# Patient Record
Sex: FEMALE | NOT HISPANIC OR LATINO | URBAN - METROPOLITAN AREA
[De-identification: names, ages, dates, MRNs, and addresses within clinical notes are randomized per-mention and may not be internally consistent; named-entity substitution may affect disease eponyms.]

---

## 2021-03-02 ENCOUNTER — IMPORTED ENCOUNTER (OUTPATIENT)
Dept: URBAN - METROPOLITAN AREA CLINIC 1 | Facility: CLINIC | Age: 71
End: 2021-03-02

## 2021-03-02 PROBLEM — H25.812: Noted: 2021-03-02

## 2021-03-02 PROBLEM — Z79.4: Noted: 2021-03-02

## 2021-03-02 PROBLEM — H40.023: Noted: 2021-03-02

## 2021-03-02 PROBLEM — Z96.1: Noted: 2021-03-02

## 2021-03-02 PROBLEM — E11.9: Noted: 2021-03-02

## 2021-03-02 PROBLEM — H04.123: Noted: 2021-03-02

## 2021-03-02 PROBLEM — H18.413: Noted: 2021-03-02

## 2021-03-02 PROCEDURE — 99204 OFFICE O/P NEW MOD 45 MIN: CPT

## 2021-03-02 PROCEDURE — 92133 CPTRZD OPH DX IMG PST SGM ON: CPT

## 2021-03-02 NOTE — PATIENT DISCUSSION
1.  DM Type II (Insulin) without sign of diabetic retinopathy and no blot heme on dilated retinal examination today OU No Macular Edema:  Discussed the pathophysiology of diabetes and its effect on the eye and risk of blindness. Stressed the importance of strong glucose control. Advised of importance of at least yearly dilated examinations but to contact us immediately for any problems or concerns. 2. Glaucoma Suspect OU (CD 0.80 OU) : OCT shows minimal thinning OD WNL OS. FM HX. Patient is considered high risk. Condition was discussed with patient and patient understands. Will continue to monitor patient for any progression in condition. Patient was advised to call us with any problems questions or concerns. 3.  Cataract OS: Observe for now without intervention. The patient was advised to contact us if any change or worsening of vision4. Dry Eyes OU - Recommend ATs TID OU routinely. 5. Pseudophakia OD - ([place] October 2020) Pt currently unhappy with myotic state OD. She would prefer to have had distance vision without correction. Discussed consideration of [address] OD vs specs. 6. Arcus OU - Observe. Patient deferred Manifest Rx today. Return for an appointment in 6 months 10/DFE/glare/HVF with Dr. Daquan Jacobson.

## 2021-09-21 ENCOUNTER — IMPORTED ENCOUNTER (OUTPATIENT)
Dept: URBAN - METROPOLITAN AREA CLINIC 1 | Facility: CLINIC | Age: 71
End: 2021-09-21

## 2021-09-21 PROBLEM — H16.143: Noted: 2021-09-21

## 2021-09-21 PROBLEM — H40.013: Noted: 2021-09-21

## 2021-09-21 PROBLEM — H04.123: Noted: 2021-09-21

## 2021-09-21 PROCEDURE — 92015 DETERMINE REFRACTIVE STATE: CPT

## 2021-09-21 PROCEDURE — 92083 EXTENDED VISUAL FIELD XM: CPT

## 2021-09-21 PROCEDURE — 99213 OFFICE O/P EST LOW 20 MIN: CPT

## 2021-09-21 NOTE — PATIENT DISCUSSION
1.  Glaucoma Suspect OU (CD 0.80 OU) -- HVF WNL OU today. FM HX. Patient is considered low risk. Condition was discussed with patient and patient understands. Will continue to monitor patient for any progression in condition. Patient was advised to call us with any problems questions or concerns. 2.  Dry Eyes with PEK OU -- Continue ATs QID OU routinely. Pt notes no improvement on tears alone. Start Restasis BID OU (erx'd) 3. Cataract OS -- Significant. Will plan Phaco after next visit once TRANG under better control. Will likely plan for OS to be distance to complete monovision state. OS it dominant eye. 4.  Pseudophakia OD -- ([place] October 2020) Pt currently unhappy with myopic state OD. She would prefer to have had distance vision without correction. Previously discussed consideration of [address] OD vs specs. 5. Arcus OU -- Observe. 6. H/o DM Type II (Insulin) w/o DR7. PCO OD -- Likely significant will re-evaluate at next visit. Patient deferred Manifest Rx today. Return for an appointment in 2-3 month 10/k check/DFE/Glare OU with Dr. Moi Valle.

## 2021-12-09 ENCOUNTER — IMPORTED ENCOUNTER (OUTPATIENT)
Dept: URBAN - METROPOLITAN AREA CLINIC 1 | Facility: CLINIC | Age: 71
End: 2021-12-09

## 2021-12-09 PROBLEM — Z79.4: Noted: 2021-12-09

## 2021-12-09 PROBLEM — E11.9: Noted: 2021-12-09

## 2021-12-09 PROBLEM — H40.013: Noted: 2021-12-09

## 2021-12-09 PROBLEM — H04.123: Noted: 2021-12-09

## 2021-12-09 PROBLEM — H16.143: Noted: 2021-12-09

## 2021-12-09 PROBLEM — H25.812: Noted: 2021-12-09

## 2021-12-09 PROBLEM — H26.491: Noted: 2021-12-09

## 2021-12-09 PROCEDURE — 92015 DETERMINE REFRACTIVE STATE: CPT

## 2021-12-09 PROCEDURE — 92012 INTRM OPH EXAM EST PATIENT: CPT

## 2021-12-09 NOTE — PATIENT DISCUSSION
1.  Cataract OS -- Visually Significant however patient defers phaco at this time. **Will likely plan for OS to be distance to complete monovision state as OS is the dominant eye. 2.  PCO OD -- Observe and consider YAG once visually significant and pt becomes symptomatic. 3.  TRANG w/ PEK OU -- PEK improved OU on Tears (pt unable to get Restasis secondary to cost) Continue ATs QID OU routinely. 4.  DM Type II (Insulin) without sign of diabetic retinopathy and no blot heme on dilated retinal examination today OU No Macular Edema:  Discussed the pathophysiology of diabetes and its effect on the eye and risk of blindness. Stressed the importance of strong glucose control. Advised of importance of at least yearly dilated examinations but to contact us immediately for any problems or concerns. 5. Glaucoma Suspect OU (CD 0.80 OU) --  FM HX. IOP stable. Past w/u negative. Patient is considered low risk. Condition was discussed with patient and patient understands. Will continue to monitor patient for any progression in condition. Patient was advised to call us with any problems questions or concerns. 6.  Pseudophakia OD -- ([place] October 2020) Pt currently unhappy with myopic state OD. She would prefer to have had distance vision without correction. Previously discussed consideration of [address] OD vs specs. 7. Arcus OU -- Observe. Return for an appointment in 6 months for a 30/OCT/glare OU with Dr. Stacy Peng.

## 2022-04-03 ASSESSMENT — VISUAL ACUITY
OS_GLARE: 20/60
OD_GLARE: 20/40
OS_CC: 20/20
OD_SC: 20/20-2
OD_SC: J1
OD_CC: 20/150
OS_CC: 20/25
OD_GLARE: 20/60
OS_SC: 20/40
OD_SC: 20/25-2
OS_SC: 20/20
OD_SC: J1+-1
OS_GLARE: 20/40
OS_GLARE: 20/150

## 2022-04-03 ASSESSMENT — TONOMETRY
OD_IOP_MMHG: 14
OD_IOP_MMHG: 13
OS_IOP_MMHG: 14
OS_IOP_MMHG: 19
OS_IOP_MMHG: 16
OD_IOP_MMHG: 15

## 2022-07-11 ENCOUNTER — COMPREHENSIVE EXAM (OUTPATIENT)
Dept: URBAN - METROPOLITAN AREA CLINIC 1 | Facility: CLINIC | Age: 72
End: 2022-07-11

## 2022-07-11 DIAGNOSIS — H16.143: ICD-10-CM

## 2022-07-11 DIAGNOSIS — H25.812: ICD-10-CM

## 2022-07-11 DIAGNOSIS — E11.9: ICD-10-CM

## 2022-07-11 DIAGNOSIS — H26.491: ICD-10-CM

## 2022-07-11 DIAGNOSIS — H04.123: ICD-10-CM

## 2022-07-11 DIAGNOSIS — Z79.4: ICD-10-CM

## 2022-07-11 DIAGNOSIS — H40.1131: ICD-10-CM

## 2022-07-11 DIAGNOSIS — H35.033: ICD-10-CM

## 2022-07-11 PROCEDURE — 92133 CPTRZD OPH DX IMG PST SGM ON: CPT

## 2022-07-11 PROCEDURE — 92015 DETERMINE REFRACTIVE STATE: CPT

## 2022-07-11 PROCEDURE — 99214 OFFICE O/P EST MOD 30 MIN: CPT

## 2022-07-11 ASSESSMENT — TONOMETRY
OS_IOP_MMHG: 14
OD_IOP_MMHG: 14

## 2022-07-11 ASSESSMENT — VISUAL ACUITY
OS_CC: 20/40
OD_CC: 20/20 -2
OS_BAT: 20/200
OD_BAT: 20/100

## 2022-07-11 NOTE — PATIENT DISCUSSION
(new)(CD: 0.80 OU) - IOP stable. (+) strong FHX (mother, brother). Relative decrease RNFL OU by OCT. Start Latanoprost QHS OU (erx'd).  Will perform VF after phaco.

## 2022-07-11 NOTE — PATIENT DISCUSSION
Visually Significant secondary to glare; schedule YAG Cap OD. Pros, cons, risks and benefits discussed with patient. Patient wishes to proceed.

## 2022-07-11 NOTE — PATIENT DISCUSSION
Visually Significant secondary to glare, discussed the risks, benefits, alternatives, and limitations of cataract surgery. The patient stated a full understanding and a desire to proceed with the procedure. The patient will need to return for pre-op appointment with cataract measurements and to have any additional questions answered, and start pre-operative eye drops as directed. Schedule Phaco/pcl OS.

## 2022-07-14 ENCOUNTER — PRE-OP/H&P (OUTPATIENT)
Dept: URBAN - METROPOLITAN AREA CLINIC 1 | Facility: CLINIC | Age: 72
End: 2022-07-14

## 2022-07-14 VITALS
BODY MASS INDEX: 29.82 KG/M2 | HEIGHT: 67 IN | HEART RATE: 91 BPM | DIASTOLIC BLOOD PRESSURE: 69 MMHG | WEIGHT: 190 LBS | SYSTOLIC BLOOD PRESSURE: 130 MMHG

## 2022-07-14 DIAGNOSIS — H26.491: ICD-10-CM

## 2022-07-14 DIAGNOSIS — Z96.1: ICD-10-CM

## 2022-07-14 DIAGNOSIS — H25.812: ICD-10-CM

## 2022-07-14 PROCEDURE — 99499 UNLISTED E&M SERVICE: CPT

## 2022-07-14 PROCEDURE — 66821 AFTER CATARACT LASER SURGERY: CPT

## 2022-07-14 PROCEDURE — 92025 CPTRIZED CORNEAL TOPOGRAPHY: CPT

## 2022-07-14 PROCEDURE — 92136 OPHTHALMIC BIOMETRY: CPT

## 2022-07-14 ASSESSMENT — TONOMETRY
OS_IOP_MMHG: 10
OD_IOP_MMHG: 10

## 2022-07-14 ASSESSMENT — VISUAL ACUITY
OS_BAT: 20/200
OD_BAT: 20/100
OD_CC: 20/20 -2
OS_CC: 20/40

## 2022-07-14 NOTE — PROCEDURE NOTE: CLINICAL
PROCEDURE NOTE: YAG Capsulotomy OD. Diagnosis: Other Secondary Cataract. Anesthesia: Topical. The purpose and nature of the procedure, possible alternative methods of treatment, the risks involved and the possibility of complications were discussed with patient. The Patient wishes to proceed and the consent was signed. 1 gtt Prolensa applied. The laser was then performed under topical anesthesia with no complications. Post op instructions were given to patient as well as a follow-up appointment. Patient was advised to call our office if any questions or concerns. Omaira Busby

## 2022-07-14 NOTE — PATIENT DISCUSSION
(CD: 0.80 OU) IOP stable. (+) strong FHX (mother, brother). Relative decrease RNFL OU by OCT. Continue Latanoprost QHS OU.  Will perform VF after phaco.

## 2022-08-16 ENCOUNTER — PRE-OP/H&P (OUTPATIENT)
Dept: URBAN - METROPOLITAN AREA CLINIC 1 | Facility: CLINIC | Age: 72
End: 2022-08-16

## 2022-08-16 VITALS
HEIGHT: 67 IN | WEIGHT: 190 LBS | DIASTOLIC BLOOD PRESSURE: 72 MMHG | HEART RATE: 85 BPM | SYSTOLIC BLOOD PRESSURE: 128 MMHG | BODY MASS INDEX: 29.82 KG/M2

## 2022-08-16 DIAGNOSIS — H25.812: ICD-10-CM

## 2022-08-16 PROCEDURE — 99499 UNLISTED E&M SERVICE: CPT

## 2022-08-16 PROCEDURE — 92025 CPTRIZED CORNEAL TOPOGRAPHY: CPT

## 2022-08-16 PROCEDURE — 92136 OPHTHALMIC BIOMETRY: CPT

## 2022-08-16 ASSESSMENT — VISUAL ACUITY
OS_CC: 20/40
OS_BAT: 20/200
OD_CC: 20/20
OD_SC: 20/60-2

## 2022-08-16 ASSESSMENT — TONOMETRY
OD_IOP_MMHG: 11
OS_IOP_MMHG: 11

## 2022-09-14 ENCOUNTER — SURGERY/PROCEDURE (OUTPATIENT)
Dept: URBAN - METROPOLITAN AREA SURGERY 1 | Facility: SURGERY | Age: 72
End: 2022-09-14

## 2022-09-14 DIAGNOSIS — H25.812: ICD-10-CM

## 2022-09-14 PROCEDURE — 66984 XCAPSL CTRC RMVL W/O ECP: CPT

## 2022-09-15 ENCOUNTER — POST-OP (OUTPATIENT)
Dept: URBAN - METROPOLITAN AREA CLINIC 1 | Facility: CLINIC | Age: 72
End: 2022-09-15

## 2022-09-15 DIAGNOSIS — Z96.1: ICD-10-CM

## 2022-09-15 PROCEDURE — 99024 POSTOP FOLLOW-UP VISIT: CPT

## 2022-09-15 ASSESSMENT — VISUAL ACUITY: OD_CC: 20/30

## 2022-09-15 ASSESSMENT — TONOMETRY: OS_IOP_MMHG: 16

## 2022-09-15 NOTE — PATIENT DISCUSSION
Use Prolensa and Tobradex XT gtts through completion of PO gtt chart regimen/ Per our instructions given to patient.

## 2022-09-15 NOTE — PATIENT DISCUSSION
(CD: 0.80 OU) IOP stable. (+) strong FamHx. Continue Latanoprost QHS OU. Will perform VF after phaco. Patient understands condition, prognosis and need for follow up care.

## 2022-10-11 ENCOUNTER — POST-OP (OUTPATIENT)
Dept: URBAN - METROPOLITAN AREA CLINIC 1 | Facility: CLINIC | Age: 72
End: 2022-10-11

## 2022-10-11 DIAGNOSIS — Z96.1: ICD-10-CM

## 2022-10-11 PROCEDURE — 99024 POSTOP FOLLOW-UP VISIT: CPT

## 2022-10-11 ASSESSMENT — TONOMETRY
OS_IOP_MMHG: 13
OD_IOP_MMHG: 13

## 2022-10-11 ASSESSMENT — VISUAL ACUITY
OD_SC: 20/50+1
OS_SC: 20/40-1

## 2022-10-13 ENCOUNTER — POST-OP (OUTPATIENT)
Dept: URBAN - METROPOLITAN AREA CLINIC 1 | Facility: CLINIC | Age: 72
End: 2022-10-13

## 2022-10-13 DIAGNOSIS — Z96.1: ICD-10-CM

## 2022-10-13 PROCEDURE — 99024 POSTOP FOLLOW-UP VISIT: CPT

## 2022-10-13 ASSESSMENT — VISUAL ACUITY
OD_CC: 20/20
OS_CC: 20/20

## 2022-10-13 ASSESSMENT — TONOMETRY
OS_IOP_MMHG: 17
OD_IOP_MMHG: 17

## 2022-10-13 NOTE — PATIENT DISCUSSION
No bump noted on patient's lid, discussed with patient that she is likely noticing Marcellus Grounds. F/u as scheduled April 2023.

## 2023-04-20 ENCOUNTER — FOLLOW UP (OUTPATIENT)
Dept: URBAN - METROPOLITAN AREA CLINIC 1 | Facility: CLINIC | Age: 73
End: 2023-04-20

## 2023-04-20 DIAGNOSIS — H40.1131: ICD-10-CM

## 2023-04-20 PROCEDURE — 99213 OFFICE O/P EST LOW 20 MIN: CPT

## 2023-04-20 PROCEDURE — 92083 EXTENDED VISUAL FIELD XM: CPT

## 2023-04-20 ASSESSMENT — TONOMETRY
OS_IOP_MMHG: 11
OD_IOP_MMHG: 11

## 2023-04-20 ASSESSMENT — VISUAL ACUITY
OD_CC: 20/20
OS_CC: 20/20

## 2024-01-30 NOTE — PATIENT DISCUSSION
Please request copy of CPAP compliance report.  Thank you.   Visually Significant Secondary to glare, discussed the risks, benefits, alternatives, and limitations of cataract surgery. The patient stated a full understanding and a desire to proceed with the procedure. Discussed with patient if post-op drops are more than $120 for all three combined when filling at their Pharmacy, please call our office to request generic substitutions. Patient came into pre-op appointment alone and lifestyle questioner completed. Patient understands she will need specs post-op to achieve best corrected vision.

## 2024-02-06 ENCOUNTER — COMPREHENSIVE EXAM (OUTPATIENT)
Dept: URBAN - METROPOLITAN AREA CLINIC 1 | Facility: CLINIC | Age: 74
End: 2024-02-06

## 2024-02-06 DIAGNOSIS — H40.1131: ICD-10-CM

## 2024-02-06 DIAGNOSIS — H04.123: ICD-10-CM

## 2024-02-06 DIAGNOSIS — H16.143: ICD-10-CM

## 2024-02-06 DIAGNOSIS — H26.492: ICD-10-CM

## 2024-02-06 DIAGNOSIS — E11.9: ICD-10-CM

## 2024-02-06 PROCEDURE — 92133 CPTRZD OPH DX IMG PST SGM ON: CPT

## 2024-02-06 PROCEDURE — 99214 OFFICE O/P EST MOD 30 MIN: CPT

## 2024-02-06 ASSESSMENT — TONOMETRY
OS_IOP_MMHG: 14
OD_IOP_MMHG: 13

## 2024-02-06 ASSESSMENT — VISUAL ACUITY
OS_CC: 20/20
OD_CC: 20/20-1

## 2024-06-20 ENCOUNTER — HOSPITAL ENCOUNTER (OUTPATIENT)
Facility: HOSPITAL | Age: 74
Setting detail: RECURRING SERIES
Discharge: HOME | End: 2024-06-23
Payer: MEDICARE

## 2024-06-20 PROCEDURE — 97161 PT EVAL LOW COMPLEX 20 MIN: CPT

## 2024-06-20 PROCEDURE — 97110 THERAPEUTIC EXERCISES: CPT

## 2024-06-20 PROCEDURE — 97162 PT EVAL MOD COMPLEX 30 MIN: CPT

## 2024-06-20 PROCEDURE — 97530 THERAPEUTIC ACTIVITIES: CPT

## 2024-06-20 NOTE — PROGRESS NOTES
PHYSICAL / OCCUPATIONAL THERAPY - DAILY TREATMENT NOTE (updated )    Patient Name: Sujey Benito    Date: 2024    : 1950  Insurance: Payor: MEDICARE / Plan: MEDICARE PART A AND B / Product Type: *No Product type* /      Patient  verified Yes     Visit #   Current / Total 1 24   Time   In / Out 1:45 2:50   Pain   In / Out 3 3   Subjective Functional Status/Changes: See POC     TREATMENT AREA =  Pain in left hip [M25.552]  Other low back pain [M54.59]    OBJECTIVE    27 min   Eval - untimed                      Therapeutic Procedures:  Tx Min Billable or 1:1 Min (if diff from Tx Min) Procedure, Rationale, Specifics   13  45032 Therapeutic Exercise (timed):  increase ROM, strength, coordination, balance, and proprioception to improve patient's ability to progress to PLOF and address remaining functional goals. (see flow sheet as applicable)     Details if applicable:  HEP instruction and demonstration     25  03660 Therapeutic Activity (timed):  use of dynamic activities replicating functional movements to increase ROM, strength, coordination, balance, and proprioception in order to improve patient's ability to progress to PLOF and address remaining functional goals.  (see flow sheet as applicable)     Details if applicable:  pt education on relevant anatomy/physiology, pt education on performing activities/recreational activities without increased pain to improve mobility and confidence, pt education on prognosis of therapy interventions.    38  [facility] Totals Reminder: bill using total billable min of TIMED therapeutic procedures (example: do not include dry needle or estim unattended, both untimed codes, in totals to left)  8-22 min = 1 unit; 23-37 min = 2 units; 38-52 min = 3 units; 53-67 min = 4 units; 68-82 min = 5 units   Total Total     TOTAL TREATMENT TIME:        65     [x]  Patient Education billed concurrently with other procedures   [x] Review HEP    [] Progressed/Changed HEP, detail:  
Physician Progress Note      Kristie Machuca  CSN #:                  [id number]  :                       1990  ADMIT DATE:       2022 12:19 AM  DISCH DATE:  RESPONDING  PROVIDER #:        Francia Rosario MD          QUERY TEXT:    Pt admitted with [facility]. Pt noted to have UTI and stevensno catheter was placed   . If possible, please document in the progress notes and discharge summary   if you are evaluating and/or treating any of the following: The medical record reflects the following:  Risk Factors: urinary retention  Clinical Indicators:  Op note; Stevenson catheter placed per RN, clear yellow   urine obtained without complication. ,   prog note; Stevenson removed   yesterday but patient had recurrent urinary retention overnight requiring   straight cath x2 each time with >1L out. Stevenson catheter replaced this morning   due to recurrent retention.  Uro Consult note; She had her Stevenson catheter   removed yesterday after it was found that she had a UTI. Treatment: Rocephin IV, Removal of Stevenson catheter, monitoring I/O ,Lab and   Vitals    Thank you, Please call if questions  Abbi Green RN [facility]  [phone]  Options provided:  -- UTI due to  indwelling Stevenson urinary catheter  -- UTI not due to  indwelling Stevenson urinary catheter  -- Other - I will add my own diagnosis  -- Disagree - Not applicable / Not valid  -- Disagree - Clinically unable to determine / Unknown  -- Refer to Clinical Documentation Reviewer    PROVIDER RESPONSE TEXT:    UTI is NOT due to the indwelling Stevenson urinary catheter.     Query created by: Marla Winn on 2022 2:33 PM      Electronically signed by:  Francia Rosario MD 5/3/2022 7:53 PM
Evolving with changing characteristics  ;Clinical Decision Making: Lower Extremity Functional Scale (LEFS) = 45 ; (40-60 Mild to Moderate Dysfunction) = MODERATE Complexity  Overall Complexity Rating: MEDIUM  Problem List: pain affecting function, decrease ROM, decrease strength, edema affection function, impaired gait/balance, decrease ADL/functional abilities, decrease activity tolerance, decrease flexibility/joint mobility, and decrease transfer abilities    Treatment Plan may include any combination of the followin Therapeutic Exercise, 28949 Neuromuscular Re-Education, 42342 Manual Therapy, 17647 Therapeutic Activity, 58000 Self Care/Home Management, 55181 Electrical Stim unattended, and 83645 Electrical Stim attended  Patient / Family readiness to learn indicated by: asking questions, trying to perform skills, interest, return verbalization , and return demonstration   Persons(s) to be included in education: patient (P)  Barriers to Learning/Limitations: none  Measures taken if barriers to learning present: n/a  Patient Goal (s): no pain  Patient Self Reported Health Status: poor  Rehabilitation Potential: fair    Short Term Goals: To be accomplished in 4 weeks   Pt will report compliance and independence to [facility] to help the pt manage their pain and symptoms.  Status at last note/certification:  established  2.     Pt will report an improvement in at worst pain to 7/10 to improve sleep quality  Status at last note/certification: 9/10  Long Term Goals: To be accomplished in 12 weeks  Pt will improve LEFS score to 55/80 points or better to improve ability to perform ADLs.  Status at last note/certification: 45/80  2.  Pt will increase MMT left hip ABD to 4+/5 to improve ability to perform daily activities with more ease.  Status at last note/certification: 4/5  3.  Pt will increase AROM right hip ER to 30 degs in sitting, left hip ER to 20 degs in sitting to improve tolerance to household chores.  Status

## 2024-06-27 PROCEDURE — G0283 ELEC STIM OTHER THAN WOUND: HCPCS

## 2024-06-27 PROCEDURE — 97535 SELF CARE MNGMENT TRAINING: CPT

## 2024-06-27 PROCEDURE — 97140 MANUAL THERAPY 1/> REGIONS: CPT

## 2024-06-27 NOTE — PROGRESS NOTES
therapeutic interventions for first treatment session.  No change toward goals today.    Patient is highly TTP along piriformis and glute med region with manual    Patient advised of DOMS and to use ice prn for 10 - 15 minutes.    Patient will continue to benefit from skilled PT / OT services to modify and progress therapeutic interventions, analyze and address functional mobility deficits, analyze and address ROM deficits, analyze and address strength deficits, analyze and address soft tissue restrictions, and analyze and cue for proper movement patterns to address functional deficits and attain remaining goals.    Progress toward goals / Updated goals:  []  See Progress Note/Recertification    Short Term Goals: To be accomplished in 4 weeks   Pt will report compliance and independence to HEP to help the pt manage their pain and symptoms.  Status at last note/certification:  established  2.     Pt will report an improvement in at worst pain to 7/10 to improve sleep quality  Status at last note/certification: 9/10  Long Term Goals: To be accomplished in 12 weeks  Pt will improve LEFS score to 55/80 points or better to improve ability to perform ADLs.  Status at last note/certification: 45/80  2.  Pt will increase MMT left hip ABD to 4+/5 to improve ability to perform daily activities with more ease.  Status at last note/certification: 4/5  3.  Pt will increase AROM right hip ER to 30 degs in sitting, left hip ER to 20 degs in sitting to improve tolerance to household chores.  Status at last note/certification: right 20 degs, left 10 degs  4.  Pt will report an improvement in ambulation tolerance to at least 30 mins to improve ability to tolerate community ambulation.  Status at last note/certification: 20 mins     Next PN/ RC due 7/20/24  Auth due (visit number/ date)     PLAN  - Continue Plan of Care  - Upgrade activities as tolerated  - Discharge due to :      Amanuel Lutz, PTA    6/27/2024    6:19 AM    Future

## 2024-07-02 ENCOUNTER — HOSPITAL ENCOUNTER (OUTPATIENT)
Facility: HOSPITAL | Age: 74
Setting detail: RECURRING SERIES
Discharge: HOME | End: 2024-07-05
Payer: MEDICARE

## 2024-07-02 PROCEDURE — 97110 THERAPEUTIC EXERCISES: CPT

## 2024-07-02 PROCEDURE — G0283 ELEC STIM OTHER THAN WOUND: HCPCS

## 2024-07-02 PROCEDURE — 97140 MANUAL THERAPY 1/> REGIONS: CPT

## 2024-07-02 NOTE — PROGRESS NOTES
PHYSICAL / OCCUPATIONAL THERAPY - DAILY TREATMENT NOTE    Patient Name: Sujey Benito    Date: 2024    : 1950  Insurance: Payor: MEDICARE / Plan: MEDICARE PART A AND B / Product Type: *No Product type* /      Patient  verified Yes     Visit #   Current / Total 3 24   Time   In / Out 140 220   Pain   In / Out 4 1   Subjective Functional Status/Changes: It's still been sore.  Did a lot of ice and my exercises.     TREATMENT AREA =  Pain in left hip [M25.552]  Other low back pain [M54.59]     OBJECTIVE    Modalities Rationale:     decrease inflammation, decrease pain, and increase tissue extensibility to improve patient's ability to progress to PLOF and address remaining functional goals.    10 min [x] Estim Unattended, type/location: SL (L) hip IFC                                     [x]  w/ice    []  w/heat    min [] Estim Attended, type/location:                                     []  w/US     []  w/ice    []  w/heat    []  TENS insruct      min []  Mechanical Traction: type/lbs                   []  pro   []  sup   []  int   []  cont    []  before manual    []  after manual    min []  Ultrasound, settings/location:      min  unbill []  Ice     []  Heat    location/position:     min []  Paraffin,  details:     min []  Vasopneumatic Device, press/temp:     min []  Whirlpool / Fluido:    If using vaso (only need to measure limb vaso being performed on)      pre-treatment girth :       post-treatment girth :       measured at (landmark location) :      min []  Other:    Skin assessment post-treatment:   Intact      Therapeutic Procedures:    Tx Min Billable or 1:1 Min (if diff from Tx Min) Procedure, Rationale, Specifics        15 15 [id number] Manual Therapy (timed):  decrease pain, increase ROM, and increase tissue extensibility to improve patient's ability to progress to PLOF and address remaining functional goals.  The manual therapy interventions were performed at a separate and distinct time from the

## 2024-07-05 ENCOUNTER — HOSPITAL ENCOUNTER (OUTPATIENT)
Facility: HOSPITAL | Age: 74
Setting detail: RECURRING SERIES
End: 2024-07-05
Payer: MEDICARE

## 2024-07-05 NOTE — PROGRESS NOTES
PHYSICAL / OCCUPATIONAL THERAPY - DAILY TREATMENT NOTE    Patient Name: Sujey Benito    Date: 2024    : 1950  Insurance: Payor: MEDICARE / Plan: MEDICARE PART A AND B / Product Type: *No Product type* /      Patient  verified Yes     Visit #   Current / Total 4 24   Time   In / Out *** ***   Pain   In / Out *** ***   Subjective Functional Status/Changes: ***     TREATMENT AREA =  Pain in left hip [M25.552]  Other low back pain [M54.59]     OBJECTIVE    {[employer] Modality Grid:72474}     Therapeutic Procedures:    Tx Min Billable or 1:1 Min (if diff from Tx Min) Procedure, Rationale, Specifics     {[employer] Ther Procedures:22164}     Details if applicable:         {[employer] Ther Procedures:92445}     Details if applicable:       {[employer] Ther Procedures:36019}     Details if applicable:       {[employer] Ther Procedures:11155}     Details if applicable:       {[employer] Ther Procedures:53329}     Details if applicable:       [facility] Totals Reminder: bill using total billable min of TIMED therapeutic procedures (example: do not include dry needle or estim unattended, both untimed codes, in totals to left)  8-22 min = 1 unit; 23-37 min = 2 units; 38-52 min = 3 units; 53-67 min = 4 units; 68-82 min = 5 units   Total Total     [x]  Patient Education billed concurrently with other procedures   [x] Review HEP    [] Progressed/Changed HEP, detail:    [] Other detail:       Objective Information/Functional Measures/Assessment    ***    Patient will continue to benefit from skilled PT / OT services to modify and progress therapeutic interventions, analyze and address functional mobility deficits, analyze and address ROM deficits, analyze and address strength deficits, analyze and address soft tissue restrictions, analyze and cue for proper movement patterns, analyze and modify for postural abnormalities, and instruct in home and community integration to address functional deficits and attain remaining

## 2024-07-09 ENCOUNTER — APPOINTMENT (OUTPATIENT)
Facility: HOSPITAL | Age: 74
End: 2024-07-09
Payer: MEDICARE

## 2024-07-11 ENCOUNTER — APPOINTMENT (OUTPATIENT)
Facility: HOSPITAL | Age: 74
End: 2024-07-11
Payer: MEDICARE

## 2024-07-16 ENCOUNTER — HOSPITAL ENCOUNTER (OUTPATIENT)
Facility: HOSPITAL | Age: 74
Setting detail: RECURRING SERIES
End: 2024-07-16
Payer: MEDICARE

## 2024-07-18 ENCOUNTER — APPOINTMENT (OUTPATIENT)
Facility: HOSPITAL | Age: 74
End: 2024-07-18
Payer: MEDICARE

## 2024-07-23 ENCOUNTER — APPOINTMENT (OUTPATIENT)
Facility: HOSPITAL | Age: 74
End: 2024-07-23
Payer: MEDICARE

## 2024-07-25 ENCOUNTER — APPOINTMENT (OUTPATIENT)
Facility: HOSPITAL | Age: 74
End: 2024-07-25
Payer: MEDICARE

## 2024-07-30 ENCOUNTER — APPOINTMENT (OUTPATIENT)
Facility: HOSPITAL | Age: 74
End: 2024-07-30
Payer: MEDICARE

## 2024-09-09 ENCOUNTER — FOLLOW UP (OUTPATIENT)
Dept: URBAN - METROPOLITAN AREA CLINIC 1 | Facility: CLINIC | Age: 74
End: 2024-09-09

## 2024-09-09 DIAGNOSIS — H04.123: ICD-10-CM

## 2024-09-09 DIAGNOSIS — H40.1131: ICD-10-CM

## 2024-09-09 DIAGNOSIS — H16.143: ICD-10-CM

## 2024-09-09 PROCEDURE — 92083 EXTENDED VISUAL FIELD XM: CPT

## 2024-09-09 PROCEDURE — 99213 OFFICE O/P EST LOW 20 MIN: CPT
